# Patient Record
(demographics unavailable — no encounter records)

---

## 2024-10-13 NOTE — HISTORY OF PRESENT ILLNESS
[FreeTextEntry1] : CPE [de-identified] : Ms. RYLEE CHOE is a 53-year female who presents for annual CPE. Patient would like to discuss weight loss options. Her BP has also been elevated in the last several weeks and consistent with reading for today.

## 2024-10-13 NOTE — PLAN
[FreeTextEntry1] : - Increasing the dose of the current blood pressure medication - Considering the start of a weight loss medication, Wegovy sent to compounding pharmacy, - Monitor blood pressure at home - Follow-up after a month

## 2024-10-13 NOTE — HEALTH RISK ASSESSMENT
[Patient reported mammogram was normal] : Patient reported mammogram was normal [Good] : ~his/her~  mood as  good [Yes] : Yes [Never] : Never [MammogramDate] : 10/23 [ColonoscopyDate] : 07/21

## 2024-10-13 NOTE — HISTORY OF PRESENT ILLNESS
[FreeTextEntry1] : CPE [de-identified] : Ms. RYLEE CHOE is a 53-year female who presents for annual CPE. Patient would like to discuss weight loss options. Her BP has also been elevated in the last several weeks and consistent with reading for today.

## 2024-11-25 NOTE — PLAN
[FreeTextEntry1] : Stop current BP medication and restart Chlorthalidone and Olmesartan. Continue GLP-1 therapy with dose increase. BP check with nurse during holiday break, otherwise monitor BP at home and call if persistently over 140/90

## 2024-11-25 NOTE — HISTORY OF PRESENT ILLNESS
[FreeTextEntry1] : BP Follow up, Weight medication refill [de-identified] : The patient has a history of hypertension and has been on medication; however, the recent blood pressure readings have been high. The patient reported feeling well but had concerns about the raised readings.  She is doing Ok on the GLP-1 therapy, minimal weight loss. Patient is willing to continue taking medication to help with weight loss.

## 2024-11-25 NOTE — HISTORY OF PRESENT ILLNESS
Comprehensive Nutrition Assessment    Type and Reason for Visit:  Initial,Consult    Nutrition Recommendations/Plan: Continue current diet. Offer nutritional supplement Ensure Enlive BID to promote optimal intake. Supplement will provide additional 700 calories and 40 g protein. Nutrition Assessment:  Pt admitted for chest pain and chronic CHF. Pt stated her appetite is poor and that she can onl eat a few small meals per day. Pt chart indicates no significant weight loss. Pt denies chewing/swallowing problems and C/D. Pt declined diet education. Encouraged pt to continue with more frequent balanced, low sodium meals throughout the day. Malnutrition Assessment:  Malnutrition Status: At risk for malnutrition (Comment)    Context:  Chronic Illness     Findings of the 6 clinical characteristics of malnutrition:  Energy Intake:  Mild decrease in energy intake (Comment)  Weight Loss:  No significant weight loss     Body Fat Loss:  No significant body fat loss     Muscle Mass Loss:  No significant muscle mass loss    Fluid Accumulation:  No significant fluid accumulation     Strength:  Not Performed    Estimated Daily Nutrient Needs:  Energy (kcal):  7099-3447 calories; Weight Used for Energy Requirements:  Current     Protein (g):  122 g; Weight Used for Protein Requirements:  Current        Fluid (ml/day):  1800 ml; Method Used for Fluid Requirements:  Other (Comment) (fluid restriction)      Nutrition Related Findings:  No edema noted. BNP: 9530, Cr: 1.11, GFR: 48. All other labs/meds reviewed. Wounds:  None       Current Nutrition Therapies:    ADULT DIET;  Regular; Low Fat/Low Chol/High Fiber/LORI; Low Sodium (2 gm); 1800 ml    Anthropometric Measures:  · Height: 5' 1\" (154.9 cm)  · Current Body Weight: 208 lb (94.3 kg)   · Ideal Body Weight: 105 lbs; % Ideal Body Weight 198.1 %   · BMI: 39.3  · Adjusted Body Weight:  ; No Adjustment   · BMI Categories: Obese Class 2 (BMI 35.0 -39.9)       Nutrition Diagnosis:   · Predicted inadequate energy intake related to early satiety as evidenced by poor intake prior to admission      Nutrition Interventions:   Food and/or Nutrient Delivery:  Continue Current Diet,Start Oral Nutrition Supplement  Nutrition Education/Counseling:  Education declined   Coordination of Nutrition Care:  Continue to monitor while inpatient,Interdisciplinary Rounds    Goals:  Adequate provision of nutrition       Nutrition Monitoring and Evaluation:   Behavioral-Environmental Outcomes:  None Identified   Food/Nutrient Intake Outcomes:  Diet Advancement/Tolerance,Food and Nutrient Intake,Supplement Intake  Physical Signs/Symptoms Outcomes:  Nutrition Focused Physical Findings,Weight,Fluid Status or Edema     Discharge Planning:     Too soon to determine     Electronically signed by Prudence Shanks RD on 3/4/22 at 1:19 PM MELIZA DejesusD, RDN, LD  Registered [address] [place] [address]  [phone] [FreeTextEntry1] : BP Follow up, Weight medication refill [de-identified] : The patient has a history of hypertension and has been on medication; however, the recent blood pressure readings have been high. The patient reported feeling well but had concerns about the raised readings.  She is doing Ok on the GLP-1 therapy, minimal weight loss. Patient is willing to continue taking medication to help with weight loss.

## 2024-12-23 NOTE — HISTORY OF PRESENT ILLNESS
[FreeTextEntry1] : Weight management/BP Check [de-identified] : Patient is following up for BP check after recent changes to BP medications. She feels well and has no acute complaints at this time.  Regarding weight loss on GLP-1 therapy, patient continue to make moderate progress. She has lost 6 pounds since last visit, which is consistent with expectations. She denies any adverse effects and would like to continue with GLP-1 therapy.

## 2024-12-23 NOTE — HISTORY OF PRESENT ILLNESS
[FreeTextEntry1] : Weight management/BP Check [de-identified] : Patient is following up for BP check after recent changes to BP medications. She feels well and has no acute complaints at this time.  Regarding weight loss on GLP-1 therapy, patient continue to make moderate progress. She has lost 6 pounds since last visit, which is consistent with expectations. She denies any adverse effects and would like to continue with GLP-1 therapy.

## 2024-12-23 NOTE — PLAN
[FreeTextEntry1] : C/W Compounded Semaglutide for weight loss. Continue with Olmesartan and chlorthalidone for BP. Monitor at home. Call if having any issues. RTO 6 weeks, sooner if having any issues.

## 2024-12-23 NOTE — REVIEW OF SYSTEMS
[Negative] : Heme/Lymph Tazorac Pregnancy And Lactation Text: This medication is not safe during pregnancy. It is unknown if this medication is excreted in breast milk.

## 2025-02-02 NOTE — ASSESSMENT
[FreeTextEntry1] : The patient's weight management plan seems to be working effectively as she has experienced weight loss. The progression will potentially involve two further increases to stabilise her medication dosage. It is anticipated that the patient would not hit a plateau within a month. In addition to her weight management, she experiences a daily burping sensation, which she tolerates and does not see as a significant issue.

## 2025-02-02 NOTE — HISTORY OF PRESENT ILLNESS
[FreeTextEntry1] : Weight management and thyroid nodule follow up [de-identified] : Patient is on GLP-1 therapy for weight loss. She reports no major concerns, except for occasional burps. Recently, she has noticed a change in her eating habits. Her appetite has decreased, reducing her consumption of large meals and alcoholic beverages. She maintains an active exercise routine. She has had a modest loss of weight since last visit.  Patient is also due for a repeat sonogram of thyroid nodule, to monitor.

## 2025-02-02 NOTE — HISTORY OF PRESENT ILLNESS
[FreeTextEntry1] : Weight management and thyroid nodule follow up [de-identified] : Patient is on GLP-1 therapy for weight loss. She reports no major concerns, except for occasional burps. Recently, she has noticed a change in her eating habits. Her appetite has decreased, reducing her consumption of large meals and alcoholic beverages. She maintains an active exercise routine. She has had a modest loss of weight since last visit.  Patient is also due for a repeat sonogram of thyroid nodule, to monitor.

## 2025-02-02 NOTE — PLAN
[FreeTextEntry1] :     - Increase medication dosage to 1.7mg weekly, continuing the current weight management plan     - Schedule a blood test for late March to early April     - Schedule US for thyroid to monitor thyroid nodule.

## 2025-03-30 NOTE — HISTORY OF PRESENT ILLNESS
[FreeTextEntry1] : Weight management [de-identified] : Patient is following up on medication assisted weight loss management on Semaglutide. She has made some progress but very slowly. She was getting medication through compounding pharmacy but due to recent regulatory changes, need to use alternative. We discussed maintaining current medication vs switching to GLP-1/GIP Tirzepatide.

## 2025-03-30 NOTE — REVIEW OF SYSTEMS
CARDIOVASCULAR - ADULT    • Maintains optimal cardiac output and hemodynamic stability Progressing    • Absence of cardiac arrhythmias or at baseline Progressing          Patient/Family Goals    • Patient/Family Long Term Goal Progressing    • Patient/Fami [Negative] : Heme/Lymph

## 2025-03-30 NOTE — HISTORY OF PRESENT ILLNESS
[FreeTextEntry1] : Weight management [de-identified] : Patient is following up on medication assisted weight loss management on Semaglutide. She has made some progress but very slowly. She was getting medication through compounding pharmacy but due to recent regulatory changes, need to use alternative. We discussed maintaining current medication vs switching to GLP-1/GIP Tirzepatide.

## 2025-03-30 NOTE — PLAN
[FreeTextEntry1] : Patient will contact relevant pharmacies regarding medications.  Continue with exercise routine and making healthy dietary choices.

## 2025-07-01 NOTE — PLAN
[FreeTextEntry1] : Start Zepbound at 7.5 mg as patient had already been on 1 mg  Wegovy, compounded. Plan would be to increase dose monthly until at the highest dose.

## 2025-07-01 NOTE — HISTORY OF PRESENT ILLNESS
[FreeTextEntry1] : Lab review/Weight management [de-identified] : Patient is presenting to follow up on HTN, Obesity and to review BW results. She is feeling well. Denies acute complaints.  She was getting Semaglutide from compounding pharmacy. We discussed other options, and patient would like to try vial Zepbound, self-pay. She exercises regularly and eats healthy overall. She has some weight since starting GLP-1 medication, but it has been slow.